# Patient Record
Sex: FEMALE | Race: WHITE | NOT HISPANIC OR LATINO | URBAN - METROPOLITAN AREA
[De-identification: names, ages, dates, MRNs, and addresses within clinical notes are randomized per-mention and may not be internally consistent; named-entity substitution may affect disease eponyms.]

---

## 2018-06-27 ENCOUNTER — EMERGENCY (EMERGENCY)
Facility: HOSPITAL | Age: 28
LOS: 1 days | Discharge: HOME | End: 2018-06-27
Admitting: EMERGENCY MEDICINE
Payer: COMMERCIAL

## 2018-06-27 VITALS
SYSTOLIC BLOOD PRESSURE: 133 MMHG | OXYGEN SATURATION: 100 % | HEART RATE: 50 BPM | RESPIRATION RATE: 17 BRPM | TEMPERATURE: 98 F | DIASTOLIC BLOOD PRESSURE: 83 MMHG

## 2018-06-27 VITALS
HEART RATE: 51 BPM | OXYGEN SATURATION: 98 % | RESPIRATION RATE: 15 BRPM | SYSTOLIC BLOOD PRESSURE: 125 MMHG | DIASTOLIC BLOOD PRESSURE: 81 MMHG

## 2018-06-27 DIAGNOSIS — Z79.1 LONG TERM (CURRENT) USE OF NON-STEROIDAL ANTI-INFLAMMATORIES (NSAID): ICD-10-CM
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
## 2018-06-27 DIAGNOSIS — Z79.899 OTHER LONG TERM (CURRENT) DRUG THERAPY: ICD-10-CM

## 2018-06-27 DIAGNOSIS — R68.84 JAW PAIN: ICD-10-CM

## 2018-06-27 DIAGNOSIS — S03.41XA: ICD-10-CM

## 2018-06-27 PROCEDURE — 99284 EMERGENCY DEPT VISIT MOD MDM: CPT

## 2018-06-27 PROCEDURE — 70486 CT MAXILLOFACIAL W/O DYE: CPT | Mod: 26

## 2018-06-27 RX ORDER — PENICILLIN V POTASSIUM 250 MG
500 TABLET ORAL ONCE
Qty: 0 | Refills: 0 | Status: COMPLETED | OUTPATIENT
Start: 2018-06-27 | End: 2018-06-27

## 2018-06-27 RX ORDER — ACETAMINOPHEN WITH CODEINE 300MG-30MG
1 TABLET ORAL ONCE
Qty: 0 | Refills: 0 | Status: DISCONTINUED | OUTPATIENT
Start: 2018-06-27 | End: 2018-06-27

## 2018-06-27 RX ORDER — DIAZEPAM 5 MG
5 TABLET ORAL ONCE
Qty: 0 | Refills: 0 | Status: DISCONTINUED | OUTPATIENT
Start: 2018-06-27 | End: 2018-06-27

## 2018-06-27 RX ORDER — DIAZEPAM 5 MG
1 TABLET ORAL
Qty: 9 | Refills: 0 | OUTPATIENT
Start: 2018-06-27 | End: 2018-06-29

## 2018-06-27 RX ADMIN — Medication 500 MILLIGRAM(S): at 23:17

## 2018-06-27 RX ADMIN — Medication 1 TABLET(S): at 21:50

## 2018-06-27 RX ADMIN — Medication 500 MILLIGRAM(S): at 21:50

## 2018-06-27 RX ADMIN — Medication 5 MILLIGRAM(S): at 23:17

## 2018-06-27 RX ADMIN — Medication 500 MILLIGRAM(S): at 20:49

## 2018-06-27 RX ADMIN — Medication 1 TABLET(S): at 20:50

## 2018-06-27 NOTE — ED PROVIDER NOTE - MEDICAL DECISION MAKING DETAILS
right sided jaw pain x this morning. will treat pain and get CT to ensure no mastoiditis. likely TMJ related. discussed with patient treatment

## 2018-06-27 NOTE — ED PROVIDER NOTE - NS ED ROS FT
· CONSTITUTIONAL: no fever and no chills.  - HEAD: +right jaw pain   · CARDIOVASCULAR: normal rate and rhythm, no chest pain and no edema.  · RESPIRATORY: no chest pain, no cough, and no shortness of breath.  · GASTROINTESTINAL: no abdominal pain, no bloating, no constipation, no diarrhea, no nausea and no vomiting.  · MUSCULOSKELETAL: no back pain, no musculoskeletal pain, no neck pain, and no weakness.  · SKIN: no abrasions, no jaundice, no lesions, no pruritis, and no rashes.  · NEURO: no loss of consciousness, no gait abnormality, no headache, no sensory deficits, and no weakness.  · PSYCHIATRIC: no known mental health issues.

## 2018-06-27 NOTE — ED PROVIDER NOTE - PROGRESS NOTE DETAILS
prelim CT reviewed with patient. prelim CT reviewed with patient. discussed prelim report with patient. will follow final read and inform her of any changes. will cover with abx. discussed TMJ prevention. patient reports feeling better. requesting to leave ED

## 2018-06-27 NOTE — ED ADULT NURSE NOTE - OBJECTIVE STATEMENT
Pt is a 27y female complaining of jaw pain 7/10 that occurred at 9:30 this morning. PT states that she was felt like she was getting an ear infection and then suddenly had right sided jaw pain. Pt states that she went to urgent care and was told that she was having a muscle spasm and was given an injection of something and given a prescription for methocarbamol. PT right side of face is swollen. Pt states that it hurts to clench her teeth paulino turn her head. Pt denies sob, chest pain, nausea, vomiting, fever, and chills. Will continue to monitor.

## 2018-06-27 NOTE — ED PROVIDER NOTE - OBJECTIVE STATEMENT
27 year old female with no PMhx presents with right sided jaw pain x this morning. she reports pain began all of a sudden. reports mild swelling to area. patient went to urgent care and was given muscle relaxer, which she feels did not help. has never had pain like this before. denies fever or dental pain.   Denies sore throat, cough, SOB, CP, palpitations, wheezing, abdominal pain, N/V/D/C, change in urinary/bowel function, dysuria, hematuria, flank pain, malaise, rash, HA, and dizziness.  No recent travel or sick contact noted.

## 2018-06-27 NOTE — ED ADULT NURSE NOTE - CHPI ED SYMPTOMS NEG
no vomiting/no change in level of consciousness/no chills/no fever/no syncope/no numbness/no blurred vision/no loss of consciousness/no nausea/no weakness

## 2018-06-27 NOTE — ED PROVIDER NOTE - PHYSICAL EXAMINATION
VITAL SIGNS: I have reviewed nursing notes and confirm.  CONSTITUTIONAL: Well-developed; well-nourished; in no acute distress.  SKIN: Skin is warm and dry, no acute rash.  HEAD: Normocephalic; atraumatic. +TTP of right TMJ joint. no swelling, no erythema on skin.   EYES: PERRL, EOM intact; conjunctiva and sclera clear.  EARS: tympanic membranes clear bilaterally, No redness, normal landmarks and light reflexes, canal clear without cerumen impaction. no mastoid tenderness  THROAT: moist mucous membranes, normal dentition, no erythema, no swelling, no exudates, no drooling, no PTA, uvula midline, no sign of infection  NECK: Supple; non tender.  CARD: S1, S2 normal; no murmurs, gallops, or rubs. Regular rate and rhythm.  RESP: No wheezes, rales or rhonchi.  ABD: Normal bowel sounds; soft; non-distended; non-tender;  no palpable or pulsating mass; no hepatosplenomegaly.  EXT: Normal ROM. No clubbing, cyanosis or edema.  NEURO: Alert, oriented. Grossly unremarkable.  PSYCH: Cooperative, appropriate.

## 2021-08-28 NOTE — ED ADULT NURSE NOTE - NSSISCREENINGQ1_ED_A_ED
Thick brown tinged secretions clung to palate. Aggressive oral care with suctioning toothbrush provided. Blue tinged thick dry palatal secretions. Oral care with suctioning toothbrush provided. No